# Patient Record
Sex: FEMALE | Race: WHITE | NOT HISPANIC OR LATINO | URBAN - METROPOLITAN AREA
[De-identification: names, ages, dates, MRNs, and addresses within clinical notes are randomized per-mention and may not be internally consistent; named-entity substitution may affect disease eponyms.]

---

## 2017-04-07 ENCOUNTER — APPOINTMENT (OUTPATIENT)
Dept: GENERAL RADIOLOGY | Facility: CLINIC | Age: 22
End: 2017-04-07
Attending: INTERNAL MEDICINE
Payer: COMMERCIAL

## 2017-04-07 ENCOUNTER — HOSPITAL ENCOUNTER (EMERGENCY)
Facility: CLINIC | Age: 22
Discharge: HOME | End: 2017-04-07
Attending: INTERNAL MEDICINE | Admitting: INTERNAL MEDICINE
Payer: COMMERCIAL

## 2017-04-07 VITALS
TEMPERATURE: 98.3 F | DIASTOLIC BLOOD PRESSURE: 82 MMHG | RESPIRATION RATE: 15 BRPM | OXYGEN SATURATION: 97 % | SYSTOLIC BLOOD PRESSURE: 133 MMHG

## 2017-04-07 DIAGNOSIS — R07.9 CHEST PAIN, UNSPECIFIED TYPE: ICD-10-CM

## 2017-04-07 LAB
ALBUMIN SERPL-MCNC: 3.9 G/DL (ref 3.4–5)
ALBUMIN UR-MCNC: 10 MG/DL
ALP SERPL-CCNC: 71 U/L (ref 40–150)
ALT SERPL W P-5'-P-CCNC: 21 U/L (ref 0–50)
ANION GAP SERPL CALCULATED.3IONS-SCNC: 10 MMOL/L (ref 3–14)
APPEARANCE UR: CLEAR
AST SERPL W P-5'-P-CCNC: 18 U/L (ref 0–35)
BASOPHILS # BLD AUTO: 0 10E9/L (ref 0–0.2)
BASOPHILS NFR BLD AUTO: 0.3 %
BILIRUB SERPL-MCNC: 1.2 MG/DL (ref 0.2–1.3)
BILIRUB UR QL STRIP: NEGATIVE
BUN SERPL-MCNC: 11 MG/DL (ref 7–30)
CALCIUM SERPL-MCNC: 9.3 MG/DL (ref 8.5–10.1)
CHLORIDE SERPL-SCNC: 108 MMOL/L (ref 96–110)
CO2 SERPL-SCNC: 23 MMOL/L (ref 20–32)
COLOR UR AUTO: ABNORMAL
CREAT SERPL-MCNC: 0.81 MG/DL (ref 0.5–1)
D DIMER PPP FEU-MCNC: 0.3 UG/ML FEU (ref 0–0.5)
DIFFERENTIAL METHOD BLD: NORMAL
EOSINOPHIL # BLD AUTO: 0.1 10E9/L (ref 0–0.7)
EOSINOPHIL NFR BLD AUTO: 0.6 %
ERYTHROCYTE [DISTWIDTH] IN BLOOD BY AUTOMATED COUNT: 12.8 % (ref 10–15)
GFR SERPL CREATININE-BSD FRML MDRD: 90 ML/MIN/1.7M2
GLUCOSE SERPL-MCNC: 100 MG/DL (ref 70–99)
GLUCOSE UR STRIP-MCNC: NEGATIVE MG/DL
HCG UR QL: NEGATIVE
HCT VFR BLD AUTO: 42.8 % (ref 35–47)
HGB BLD-MCNC: 13.9 G/DL (ref 11.7–15.7)
HGB UR QL STRIP: NEGATIVE
IMM GRANULOCYTES # BLD: 0 10E9/L (ref 0–0.4)
IMM GRANULOCYTES NFR BLD: 0.2 %
INR PPP: 1.15 (ref 0.86–1.14)
INTERPRETATION ECG - MUSE: NORMAL
KETONES UR STRIP-MCNC: NEGATIVE MG/DL
LEUKOCYTE ESTERASE UR QL STRIP: NEGATIVE
LIPASE SERPL-CCNC: 215 U/L (ref 73–393)
LYMPHOCYTES # BLD AUTO: 1.8 10E9/L (ref 0.8–5.3)
LYMPHOCYTES NFR BLD AUTO: 17.3 %
MCH RBC QN AUTO: 27.9 PG (ref 26.5–33)
MCHC RBC AUTO-ENTMCNC: 32.5 G/DL (ref 31.5–36.5)
MCV RBC AUTO: 86 FL (ref 78–100)
MONOCYTES # BLD AUTO: 0.5 10E9/L (ref 0–1.3)
MONOCYTES NFR BLD AUTO: 4.4 %
MUCOUS THREADS #/AREA URNS LPF: PRESENT /LPF
NEUTROPHILS # BLD AUTO: 8 10E9/L (ref 1.6–8.3)
NEUTROPHILS NFR BLD AUTO: 77.2 %
NITRATE UR QL: NEGATIVE
NRBC # BLD AUTO: 0 10*3/UL
NRBC BLD AUTO-RTO: 0 /100
PH UR STRIP: 6 PH (ref 5–7)
PLATELET # BLD AUTO: 205 10E9/L (ref 150–450)
POTASSIUM SERPL-SCNC: 3.6 MMOL/L (ref 3.4–5.3)
PROT SERPL-MCNC: 7.2 G/DL (ref 6.8–8.8)
RBC # BLD AUTO: 4.98 10E12/L (ref 3.8–5.2)
RBC #/AREA URNS AUTO: 0 /HPF (ref 0–2)
SODIUM SERPL-SCNC: 142 MMOL/L (ref 133–144)
SP GR UR STRIP: 1.01 (ref 1–1.03)
SQUAMOUS #/AREA URNS AUTO: 2 /HPF (ref 0–1)
TROPONIN I SERPL-MCNC: NORMAL UG/L (ref 0–0.04)
URN SPEC COLLECT METH UR: ABNORMAL
UROBILINOGEN UR STRIP-MCNC: NORMAL MG/DL (ref 0–2)
WBC # BLD AUTO: 10.4 10E9/L (ref 4–11)
WBC #/AREA URNS AUTO: 1 /HPF (ref 0–2)

## 2017-04-07 PROCEDURE — 81025 URINE PREGNANCY TEST: CPT | Performed by: INTERNAL MEDICINE

## 2017-04-07 PROCEDURE — 81001 URINALYSIS AUTO W/SCOPE: CPT | Performed by: INTERNAL MEDICINE

## 2017-04-07 PROCEDURE — 99285 EMERGENCY DEPT VISIT HI MDM: CPT | Mod: 25 | Performed by: INTERNAL MEDICINE

## 2017-04-07 PROCEDURE — 25000128 H RX IP 250 OP 636: Performed by: INTERNAL MEDICINE

## 2017-04-07 PROCEDURE — 93005 ELECTROCARDIOGRAM TRACING: CPT | Performed by: INTERNAL MEDICINE

## 2017-04-07 PROCEDURE — 93010 ELECTROCARDIOGRAM REPORT: CPT | Mod: Z6 | Performed by: INTERNAL MEDICINE

## 2017-04-07 PROCEDURE — 85379 FIBRIN DEGRADATION QUANT: CPT | Performed by: INTERNAL MEDICINE

## 2017-04-07 PROCEDURE — 71010 XR CHEST PORT 1 VW: CPT

## 2017-04-07 PROCEDURE — 80053 COMPREHEN METABOLIC PANEL: CPT | Performed by: INTERNAL MEDICINE

## 2017-04-07 PROCEDURE — 83690 ASSAY OF LIPASE: CPT | Performed by: INTERNAL MEDICINE

## 2017-04-07 PROCEDURE — 85610 PROTHROMBIN TIME: CPT | Performed by: INTERNAL MEDICINE

## 2017-04-07 PROCEDURE — 96360 HYDRATION IV INFUSION INIT: CPT | Performed by: INTERNAL MEDICINE

## 2017-04-07 PROCEDURE — 85025 COMPLETE CBC W/AUTO DIFF WBC: CPT | Performed by: INTERNAL MEDICINE

## 2017-04-07 PROCEDURE — 84484 ASSAY OF TROPONIN QUANT: CPT | Performed by: INTERNAL MEDICINE

## 2017-04-07 RX ORDER — NAPROXEN 500 MG/1
500 TABLET ORAL 2 TIMES DAILY PRN
Qty: 30 TABLET | Refills: 0 | Status: SHIPPED | OUTPATIENT
Start: 2017-04-07

## 2017-04-07 RX ADMIN — SODIUM CHLORIDE 1000 ML: 9 INJECTION, SOLUTION INTRAVENOUS at 08:18

## 2017-04-07 ASSESSMENT — ENCOUNTER SYMPTOMS: NAUSEA: 1

## 2017-04-07 NOTE — ED PROVIDER NOTES
"  History     Chief Complaint   Patient presents with     Chest Pain     HPI  Ade Pena is a 21 year old, otherwise healthy female who presents with chest pain. The patient reports that she was awakened around 4:30 AM today by a sharp pain and pressure in the left lateral chest. She notes that she rolled over, and states that \"something moved\" in her chest when she did this, and the chest pressure improved, though she continued to have an aching pain in her chest. She complains of associated nausea. She denies shortness of breath, though the pain worsens with taking deep breaths. She denies having any chronic medical problems. She has been on birth control for the past 2 years. She denies any lower extremity pain or swelling. She denies any personal or family history of blood clots.     History reviewed. No pertinent past medical history.    History reviewed. No pertinent surgical history.    No family history on file.    Social History   Substance Use Topics     Smoking status: Never Smoker     Smokeless tobacco: Not on file     Alcohol use Not on file     No current facility-administered medications for this encounter.      Current Outpatient Prescriptions   Medication     naproxen (NAPROSYN) 500 MG tablet      No Known Allergies     I have reviewed the Medications, Allergies, Past Medical and Surgical History, and Social History in the Epic system.    Review of Systems   Cardiovascular: Positive for chest pain (left lateral). Negative for leg swelling.   Gastrointestinal: Positive for nausea.   All other systems reviewed and are negative.      Physical Exam   BP: (!) 149/93  Heart Rate: 75  Temp: 98.3  F (36.8  C)  Resp: 16  SpO2: 97 %  Physical Exam   Constitutional: She is oriented to person, place, and time. No distress.   HENT:   Head: Atraumatic.   Mouth/Throat: Oropharynx is clear and moist. No oropharyngeal exudate.   Eyes: Pupils are equal, round, and reactive to light. No scleral icterus. "   Neck: Neck supple. No JVD present.   Cardiovascular: Normal rate, normal heart sounds and intact distal pulses.  Exam reveals no gallop and no friction rub.    No murmur heard.  Pulmonary/Chest: Effort normal and breath sounds normal. No respiratory distress. She has no wheezes. She has no rales. She exhibits no tenderness.   Abdominal: Soft. Bowel sounds are normal. She exhibits no distension and no mass. There is no tenderness. There is no rebound and no guarding.   Musculoskeletal: She exhibits no edema or tenderness.   Neurological: She is alert and oriented to person, place, and time. No cranial nerve deficit. Coordination normal.   Skin: Skin is warm. No rash noted. She is not diaphoretic.       ED Course     ED Course     Procedures     8:04 AM  The patient was seen and examined by Dr. Concepcion in Room 6.               EKG Interpretation:      Interpreted by Domenica Concepcion MD  Time reviewed: 7:46 AM  Symptoms at time of EKG: chest pain    Rhythm: normal sinus   Rate: 86 bpm  Axis: Normal  Ectopy: none  Conduction: normal  ST Segments/ T Waves: No ST-T wave changes  Q Waves: none  Comparison to prior: No old EKG available    Clinical Impression: normal EKG    Results for orders placed or performed during the hospital encounter of 04/07/17 (from the past 24 hour(s))   EKG 12 lead     Status: None    Collection Time: 04/07/17  7:45 AM   Result Value Ref Range    Interpretation ECG Click View Image link to view waveform and result    CBC with platelets differential     Status: None    Collection Time: 04/07/17  8:00 AM   Result Value Ref Range    WBC 10.4 4.0 - 11.0 10e9/L    RBC Count 4.98 3.8 - 5.2 10e12/L    Hemoglobin 13.9 11.7 - 15.7 g/dL    Hematocrit 42.8 35.0 - 47.0 %    MCV 86 78 - 100 fl    MCH 27.9 26.5 - 33.0 pg    MCHC 32.5 31.5 - 36.5 g/dL    RDW 12.8 10.0 - 15.0 %    Platelet Count 205 150 - 450 10e9/L    Diff Method Automated Method     % Neutrophils 77.2 %    % Lymphocytes 17.3 %    % Monocytes  4.4 %    % Eosinophils 0.6 %    % Basophils 0.3 %    % Immature Granulocytes 0.2 %    Nucleated RBCs 0 0 /100    Absolute Neutrophil 8.0 1.6 - 8.3 10e9/L    Absolute Lymphocytes 1.8 0.8 - 5.3 10e9/L    Absolute Monocytes 0.5 0.0 - 1.3 10e9/L    Absolute Eosinophils 0.1 0.0 - 0.7 10e9/L    Absolute Basophils 0.0 0.0 - 0.2 10e9/L    Abs Immature Granulocytes 0.0 0 - 0.4 10e9/L    Absolute Nucleated RBC 0.0    D dimer quantitative     Status: None    Collection Time: 04/07/17  8:00 AM   Result Value Ref Range    D Dimer 0.3 0.0 - 0.50 ug/ml FEU   INR     Status: Abnormal    Collection Time: 04/07/17  8:00 AM   Result Value Ref Range    INR 1.15 (H) 0.86 - 1.14   Comprehensive metabolic panel     Status: Abnormal    Collection Time: 04/07/17  8:00 AM   Result Value Ref Range    Sodium 142 133 - 144 mmol/L    Potassium 3.6 3.4 - 5.3 mmol/L    Chloride 108 96 - 110 mmol/L    Carbon Dioxide 23 20 - 32 mmol/L    Anion Gap 10 3 - 14 mmol/L    Glucose 100 (H) 70 - 99 mg/dL    Urea Nitrogen 11 7 - 30 mg/dL    Creatinine 0.81 0.50 - 1.00 mg/dL    GFR Estimate 90 >60 mL/min/1.7m2    GFR Estimate If Black >90   GFR Calc   >60 mL/min/1.7m2    Calcium 9.3 8.5 - 10.1 mg/dL    Bilirubin Total 1.2 0.2 - 1.3 mg/dL    Albumin 3.9 3.4 - 5.0 g/dL    Protein Total 7.2 6.8 - 8.8 g/dL    Alkaline Phosphatase 71 40 - 150 U/L    ALT 21 0 - 50 U/L    AST 18 0 - 35 U/L   Lipase     Status: None    Collection Time: 04/07/17  8:00 AM   Result Value Ref Range    Lipase 215 73 - 393 U/L   Troponin I     Status: None    Collection Time: 04/07/17  8:00 AM   Result Value Ref Range    Troponin I ES  0.000 - 0.045 ug/L     <0.015  The 99th percentile for upper reference range is 0.045 ug/L.  Troponin values in   the range of 0.045 - 0.120 ug/L may be associated with risks of adverse   clinical events.     HCG qualitative urine     Status: None    Collection Time: 04/07/17  8:12 AM   Result Value Ref Range    HCG Qual Urine Negative NEG    UA reflex to Microscopic and Culture     Status: Abnormal    Collection Time: 04/07/17  8:12 AM   Result Value Ref Range    Color Urine Light Yellow     Appearance Urine Clear     Glucose Urine Negative NEG mg/dL    Bilirubin Urine Negative NEG    Ketones Urine Negative NEG mg/dL    Specific Gravity Urine 1.015 1.003 - 1.035    Blood Urine Negative NEG    pH Urine 6.0 5.0 - 7.0 pH    Protein Albumin Urine 10 (A) NEG mg/dL    Urobilinogen mg/dL Normal 0.0 - 2.0 mg/dL    Nitrite Urine Negative NEG    Leukocyte Esterase Urine Negative NEG    Source Unspecified Urine     RBC Urine 0 0 - 2 /HPF    WBC Urine 1 0 - 2 /HPF    Squamous Epithelial /HPF Urine 2 (H) 0 - 1 /HPF    Mucous Urine Present (A) NEG /LPF   XR Chest Port 1 View     Status: None    Collection Time: 04/07/17  8:27 AM    Narrative    XR CHEST PORT 1 VW  4/7/2017 8:27 AM      HISTORY: cp    COMPARISON: None available    FINDINGS:   Single PA view of the chest was obtained.  Support devices:None.   Cardiomediastinal silhouette is unremarkable. Pulmonary vasculature is  distinct. No focal airspace opacity. No pleural effusion. No  pneumothorax. The trachea is midline. No acute osseous abnormality.  Visualized upper abdomen unremarkable.      Impression    IMPRESSION:   No acute cardiopulmonary abnormality.      I have personally reviewed the examination and initial interpretation  and I agree with the findings.    DIPIKA SULTANA MD             Labs Ordered and Resulted from Time of ED Arrival Up to the Time of Departure from the ED   INR - Abnormal; Notable for the following:        Result Value    INR 1.15 (*)     All other components within normal limits   COMPREHENSIVE METABOLIC PANEL - Abnormal; Notable for the following:     Glucose 100 (*)     All other components within normal limits   UA MACROSCOPIC WITH REFLEX TO MICRO AND CULTURE - Abnormal; Notable for the following:     Protein Albumin Urine 10 (*)     Squamous Epithelial /HPF Urine 2 (*)      Mucous Urine Present (*)     All other components within normal limits   CBC WITH PLATELETS DIFFERENTIAL   D DIMER QUANTITATIVE   LIPASE   TROPONIN I   HCG QUALITATIVE URINE   CARDIAC CONTINUOUS MONITORING       Assessments & Plan (with Medical Decision Making)  Chest pain, plueritic, EKG and trop neg, D Dimer neg, likely pleurodynia clinically, will DC with naproxen prn, follow up with her PMD.       I have reviewed the nursing notes.    I have reviewed the findings, diagnosis, plan and need for follow up with the patient.    Discharge Medication List as of 4/7/2017 10:49 AM      START taking these medications    Details   naproxen (NAPROSYN) 500 MG tablet Take 1 tablet (500 mg) by mouth 2 times daily as needed for moderate pain, Disp-30 tablet, R-0, Local Print             Final diagnoses:   Chest pain, unspecified type     I, Richy Christianson, am serving as a trained medical scribe to document services personally performed by Domenica Concepcion MD, based on the provider's statements to me.   I, Domenica Concepcion MD, was physically present and have reviewed and verified the accuracy of this note documented by Richy Christianson.     4/7/2017   [facility], [place], EMERGENCY DEPARTMENT     Domenica Concepcion MD  04/07/17 3246

## 2017-04-07 NOTE — ED AVS SNAPSHOT
[facility], [place], Emergency Department    [address] [place] [address]    Phone:  [phone]                                       Ade Pena   MRN: [id number]    Department:  [facility], Emergency Department   Date of Visit:  4/7/2017           After Visit Summary Signature Page     I have received my discharge instructions, and my questions have been answered. I have discussed any challenges I see with this plan with the nurse or doctor.    ..........................................................................................................................................  Patient/Patient Representative Signature      ..........................................................................................................................................  Patient Representative Print Name and Relationship to Patient    ..................................................               ................................................  Date                                            Time    ..........................................................................................................................................  Reviewed by Signature/Title    ...................................................              ..............................................  Date                                                            Time

## 2017-04-07 NOTE — ED NOTES
Pt presents to ED for a complaint of chest pain and shortness of breath at about 0430 this morning. Pt states that the pain woke her up. The pain has now resolved and she is not short of breath.

## 2017-04-07 NOTE — ED AVS SNAPSHOT
[facility], Emergency Department    [address] [facility] [address]    Phone:  [phone]                                       Ade Pean   MRN: [id number]    Department:  [facility], Emergency Department   Date of Visit:  4/7/2017           Patient Information     Date Of Birth          1995        Your diagnoses for this visit were:     Chest pain, unspecified type        You were seen by Domenica Concepcion MD.        Discharge Instructions       Please make an appointment to follow up with Your Primary Care Provider in 5-10 days if not improving.     Discharge References/Attachments     PLEURISY (ENGLISH)      24 Hour Appointment Hotline       To make an appointment at any [facility] clinic, call [phone] ([phone]). If you don't have a family doctor or clinic, we will help you find one. [facility] clinics are conveniently located to serve the needs of you and your family.             Review of your medicines      START taking        Dose / Directions Last dose taken    naproxen 500 MG tablet   Commonly known as:  NAPROSYN   Dose:  500 mg   Quantity:  30 tablet        Take 1 tablet (500 mg) by mouth 2 times daily as needed for moderate pain   Refills:  0                Prescriptions were sent or printed at these locations (1 Prescription)                   Other Prescriptions                Printed at Department/Unit printer (1 of 1)         naproxen (NAPROSYN) 500 MG tablet                Procedures and tests performed during your visit     CBC with platelets differential    Cardiac Continuous Monitoring    Comprehensive metabolic panel    D dimer quantitative    EKG 12 lead    HCG qualitative urine    INR    Lipase    Troponin I    UA reflex to Microscopic and Culture    XR Chest Port 1 View      Orders Needing Specimen Collection     None      Pending Results     Date and Time Order Name Status Description    4/7/2017 0811 XR Chest Port 1 View In process             Pending  "Culture Results     No orders found from 2017 to 2017.            Thank you for choosing [facility]       Thank you for choosing [facility] for your care. Our goal is always to provide you with excellent care. Hearing back from our patients is one way we can continue to improve our services. Please take a few minutes to complete the written survey that you may receive in the mail after you visit with us. Thank you!        [facility] Information     [employer] lets you send messages to your doctor, view your test results, renew your prescriptions, schedule appointments and more. To sign up, go to [email or web address] . Click on \"Log in\" on the left side of the screen, which will take you to the Welcome page. Then click on \"Sign up Now\" on the right side of the page.     You will be asked to enter the access code listed below, as well as some personal information. Please follow the directions to create your username and password.     Your access code is: [email or web address]  Expires: 2017 10:49 AM     Your access code will  in 90 days. If you need help or a new code, please call your [facility] clinic or [phone].        Care EveryWhere ID     This is your Care EveryWhere ID. This could be used by other organizations to access your [facility] medical records  [id number]        After Visit Summary       This is your record. Keep this with you and show to your community pharmacist(s) and doctor(s) at your next visit.                  "

## 2017-08-11 ENCOUNTER — RECORDS - HEALTHEAST (OUTPATIENT)
Dept: ADMINISTRATIVE | Facility: OTHER | Age: 22
End: 2017-08-11

## 2017-08-18 ENCOUNTER — HOSPITAL ENCOUNTER (OUTPATIENT)
Dept: CARDIOLOGY | Facility: CLINIC | Age: 22
Discharge: HOME | End: 2017-08-18
Attending: FAMILY MEDICINE

## 2017-08-18 DIAGNOSIS — R07.9 CHEST PAIN: ICD-10-CM

## 2017-08-18 LAB
CV STRESS CURRENT BP HE: NORMAL
CV STRESS CURRENT HR HE: 102
CV STRESS CURRENT HR HE: 103
CV STRESS CURRENT HR HE: 108
CV STRESS CURRENT HR HE: 110
CV STRESS CURRENT HR HE: 111
CV STRESS CURRENT HR HE: 113
CV STRESS CURRENT HR HE: 116
CV STRESS CURRENT HR HE: 122
CV STRESS CURRENT HR HE: 123
CV STRESS CURRENT HR HE: 124
CV STRESS CURRENT HR HE: 127
CV STRESS CURRENT HR HE: 130
CV STRESS CURRENT HR HE: 133
CV STRESS CURRENT HR HE: 149
CV STRESS CURRENT HR HE: 154
CV STRESS CURRENT HR HE: 160
CV STRESS CURRENT HR HE: 172
CV STRESS CURRENT HR HE: 172
CV STRESS CURRENT HR HE: 176
CV STRESS CURRENT HR HE: 185
CV STRESS CURRENT HR HE: 186
CV STRESS CURRENT HR HE: 190
CV STRESS CURRENT HR HE: 78
CV STRESS CURRENT HR HE: 91
CV STRESS DEVIATION TIME HE: NORMAL
CV STRESS ECHO PERCENT HR HE: NORMAL
CV STRESS EXERCISE STAGE HE: NORMAL
CV STRESS EXERCISE STAGE REACHED HE: NORMAL
CV STRESS FINAL RESTING BP HE: NORMAL
CV STRESS FINAL RESTING HR HE: 103
CV STRESS MAX HR HE: 191
CV STRESS MAX TREADMILL GRADE HE: 16
CV STRESS MAX TREADMILL SPEED HE: 4.2
CV STRESS PEAK DIA BP HE: NORMAL
CV STRESS PEAK SYS BP HE: NORMAL
CV STRESS PHASE HE: NORMAL
CV STRESS PROTOCOL HE: NORMAL
CV STRESS RESTING PT POSITION HE: NORMAL
CV STRESS RESTING PT POSITION HE: NORMAL
CV STRESS ST DEVIATION AMOUNT HE: NORMAL
CV STRESS ST DEVIATION ELEVATION HE: NORMAL
CV STRESS ST EVELATION AMOUNT HE: NORMAL
CV STRESS TEST TYPE HE: NORMAL
CV STRESS TOTAL STAGE TIME MIN 1 HE: NORMAL
STRESS ECHO BASELINE BP: NORMAL
STRESS ECHO BASELINE HR: 86
STRESS ECHO CALCULATED PERCENT HR: 96 %
STRESS ECHO LAST STRESS BP: NORMAL
STRESS ECHO LAST STRESS HR: 190
STRESS ECHO POST ESTIMATED WORKLOAD: 12.1
STRESS ECHO POST EXERCISE DUR MIN: 10
STRESS ECHO POST EXERCISE DUR SEC: 35
STRESS ECHO TARGET HR: 192

## 2017-08-29 ENCOUNTER — RECORDS - HEALTHEAST (OUTPATIENT)
Dept: ADMINISTRATIVE | Facility: OTHER | Age: 22
End: 2017-08-29

## 2017-09-01 ENCOUNTER — OFFICE VISIT - HEALTHEAST (OUTPATIENT)
Dept: CARDIOLOGY | Facility: CLINIC | Age: 22
End: 2017-09-01

## 2017-09-01 DIAGNOSIS — R07.9 CHEST PAIN, UNSPECIFIED TYPE: ICD-10-CM

## 2017-09-01 ASSESSMENT — MIFFLIN-ST. JEOR: SCORE: 1399.52

## 2017-11-01 ENCOUNTER — RECORDS - HEALTHEAST (OUTPATIENT)
Dept: ADMINISTRATIVE | Facility: OTHER | Age: 22
End: 2017-11-01

## 2018-12-22 ENCOUNTER — RECORDS - HEALTHEAST (OUTPATIENT)
Dept: ADMINISTRATIVE | Facility: OTHER | Age: 23
End: 2018-12-22

## 2018-12-28 ENCOUNTER — AMBULATORY - HEALTHEAST (OUTPATIENT)
Dept: ADMINISTRATIVE | Facility: CLINIC | Age: 23
End: 2018-12-28

## 2018-12-28 DIAGNOSIS — L72.3 SEBACEOUS CYST: ICD-10-CM

## 2019-01-04 ENCOUNTER — OFFICE VISIT - HEALTHEAST (OUTPATIENT)
Dept: SURGERY | Facility: CLINIC | Age: 24
End: 2019-01-04

## 2019-01-04 DIAGNOSIS — M79.621 AXILLARY PAIN, RIGHT: ICD-10-CM

## 2019-01-04 ASSESSMENT — MIFFLIN-ST. JEOR: SCORE: 1464.38

## 2021-05-31 VITALS — BODY MASS INDEX: 19.11 KG/M2 | HEIGHT: 69 IN | WEIGHT: 129 LBS

## 2021-06-02 VITALS — BODY MASS INDEX: 21.22 KG/M2 | WEIGHT: 143.3 LBS | HEIGHT: 69 IN

## 2021-06-18 NOTE — LETTER
Letter by Jah Lane MD at      Author: Jah Lane MD Service: -- Author Type: --    Filed:  Encounter Date: 1/4/2019 Status: (Other)       Helena Last MD  [address]  [place] [address]                                  January 21, 2019    Patient: Ade Pena   MR Number: [id number]   YOB: 1995   Date of Visit: 1/4/2019     Dear Dr. Berhane MD:    Thank you for referring Ade Pena to me for evaluation. Below are the relevant portions of my assessment and plan of care.    If you have questions, please do not hesitate to call me. I look forward to following Ade along with you.    Sincerely,        Jah Lane MD          CC  No Recipients  Jah Lane MD  1/21/2019  2:31 PM  Sign at close encounter  [facility]    HPI:    23 y.o. year old female who I have been consulted by Helena Last MD for evaluation of Consult (Sebaceous Cyst right axilla )  She is here for evaluation of, palpable mass area in the right axilla.  She said this evaluated multiple times before went through and saw her prior studies and workups is all been counted document this is probably an accessory breast tissue.    Allergies:Patient has no known allergies.    Past Medical History:   Diagnosis Date   ? Generalized anxiety disorder        Past Surgical History:   Procedure Laterality Date   ? WISDOM TOOTH EXTRACTION         CURRENT MEDS:  Current Outpatient Medications on File Prior to Visit   Medication Sig Dispense Refill   ? FLUoxetine (PROZAC) 10 MG tablet Take 10 mg by mouth daily.     ? levonorgestrel-ethinyl estradiol (AVIANE,ALESSE,LESSINA) 0.1-20 mg-mcg per tablet Take 1 tablet by mouth daily.     ? cholecalciferol, vitamin D3, 1,000 unit tablet Take 1,000 Units by mouth daily.     ? OMEGA-3/DHA/EPA/FISH OIL (FISH OIL-OMEGA-3 FATTY ACIDS) 300-1,000 mg capsule Take 500 mg by mouth daily.       No current facility-administered medications on file prior to  "visit.        Family History   Problem Relation Age of Onset   ? Atrial fibrillation Paternal Aunt    ? No Medical Problems Mother    ? Dyslipidemia Father    ? No Medical Problems Sister    ? CABG Maternal Grandfather 58   ? Acute Myocardial Infarction Maternal Grandfather 58        smoker   ? Amputation of Leg Below Knee Maternal Grandfather 58   ? Sudden death Neg Hx         reports that  has never smoked. she has never used smokeless tobacco. She reports that she drinks alcohol. She reports that she does not use drugs.    Review of Systems:  Negative except area of her right breast was count of her right axilla which bothers her off and on.  When she thinks was probably cyclic in nature. Otherwise twelve system of review is negative.      OBJECTIVE:  Vitals:    01/04/19 1231   BP: 133/86   Patient Site: Right Arm   Patient Position: Sitting   Cuff Size: Adult Regular   Pulse: 78   Weight: 143 lb 4.8 oz (65 kg)   Height: 5' 9\" (1.753 m)     Body mass index is 21.16 kg/m .    EXAM:  GENERAL: This is a well-developed 23 y.o. female who appears her stated age  HEAD: normocephalic  HEENT: Pupils equal and reactive bilaterally  Right axilla palpable breast tail in axilla. No enlarged lymph nodes  EXTREMITIES: Grossly normal, warm,   NEUROLOGIC: Focally intact, nonfocal  INTEGUMENT: No open lesions or ulcers  VASCULAR: Pulses intact, symmetrical upper and lower extremities.        LABS:  No results found for: WBC, HGB, HCT, MCV, PLT  INR/Prothrombin Time      No results found for: HGBA1C  No results found for: ALT, AST, GGT, ALKPHOS, BILITOT     Images:   Reviewed ultrasound from sample radiology which shows that this is accessory gland tissue from her breast in the right axilla    Assessment/Plan:   History of breast tissue in the right axilla noted    I discussed with her today the 2 you could excise this there are some risks related to that about doing so.  She is under local MAC anesthesia the same day surgery " setting I would not recommend it but it slowly up to her if she would like to proceed she could I also gave her an option get a second opinion she seems frustrated she thinks this is a cystic count of issue and this needs to be resected and sent a big deal but it is probably breast tissue and extension of breast tissue.  I gave her doctors? Number is an option here in clinic for a second opinion    No Follow-up on file.     Jah Lane MD  [facility] Department of Surgery    Simeon Quezada CMA  1/4/2019 12:31 PM  Sign at close encounter  Excision education & surgery packet provided to luna JOHANSEN. CARLY Quezada ([facility])     Ph: [phone]    Fx: [phone]

## 2021-06-22 NOTE — PROGRESS NOTES
Excision education & surgery packet provided to pt.    Simeon Quezada CMA ([facility])     Ph: [phone]    Fx: [phone]

## 2021-06-23 NOTE — PROGRESS NOTES
[facility]    HPI:    23 y.o. year old female who I have been consulted by Helena Last MD for evaluation of Consult (Sebaceous Cyst right axilla )  She is here for evaluation of, palpable mass area in the right axilla.  She said this evaluated multiple times before went through and saw her prior studies and workups is all been counted document this is probably an accessory breast tissue.    Allergies:Patient has no known allergies.    Past Medical History:   Diagnosis Date     Generalized anxiety disorder        Past Surgical History:   Procedure Laterality Date     WISDOM TOOTH EXTRACTION         CURRENT MEDS:  Current Outpatient Medications on File Prior to Visit   Medication Sig Dispense Refill     FLUoxetine (PROZAC) 10 MG tablet Take 10 mg by mouth daily.       levonorgestrel-ethinyl estradiol (AVIANE,ALESSE,LESSINA) 0.1-20 mg-mcg per tablet Take 1 tablet by mouth daily.       cholecalciferol, vitamin D3, 1,000 unit tablet Take 1,000 Units by mouth daily.       OMEGA-3/DHA/EPA/FISH OIL (FISH OIL-OMEGA-3 FATTY ACIDS) 300-1,000 mg capsule Take 500 mg by mouth daily.       No current facility-administered medications on file prior to visit.        Family History   Problem Relation Age of Onset     Atrial fibrillation Paternal Aunt      No Medical Problems Mother      Dyslipidemia Father      No Medical Problems Sister      CABG Maternal Grandfather 58     Acute Myocardial Infarction Maternal Grandfather 58        smoker     Amputation of Leg Below Knee Maternal Grandfather 58     Sudden death Neg Hx         reports that  has never smoked. she has never used smokeless tobacco. She reports that she drinks alcohol. She reports that she does not use drugs.    Review of Systems:  Negative except area of her right breast was count of her right axilla which bothers her off and on.  When she thinks was probably cyclic in nature. Otherwise twelve system of review is negative.      OBJECTIVE:  Vitals:     "01/04/19 1231   BP: 133/86   Patient Site: Right Arm   Patient Position: Sitting   Cuff Size: Adult Regular   Pulse: 78   Weight: 143 lb 4.8 oz (65 kg)   Height: 5' 9\" (1.753 m)     Body mass index is 21.16 kg/m .    EXAM:  GENERAL: This is a well-developed 23 y.o. female who appears her stated age  HEAD: normocephalic  HEENT: Pupils equal and reactive bilaterally  Right axilla palpable breast tail in axilla. No enlarged lymph nodes  EXTREMITIES: Grossly normal, warm,   NEUROLOGIC: Focally intact, nonfocal  INTEGUMENT: No open lesions or ulcers  VASCULAR: Pulses intact, symmetrical upper and lower extremities.        LABS:  No results found for: WBC, HGB, HCT, MCV, PLT  INR/Prothrombin Time      No results found for: HGBA1C  No results found for: ALT, AST, GGT, ALKPHOS, BILITOT     Images:   Reviewed ultrasound from sample radiology which shows that this is accessory gland tissue from her breast in the right axilla    Assessment/Plan:   History of breast tissue in the right axilla noted    I discussed with her today the 2 you could excise this there are some risks related to that about doing so.  She is under local MAC anesthesia the same day surgery setting I would not recommend it but it slowly up to her if she would like to proceed she could I also gave her an option get a second opinion she seems frustrated she thinks this is a cystic count of issue and this needs to be resected and sent a big deal but it is probably breast tissue and extension of breast tissue.  I gave her doctors  Number is an option here in clinic for a second opinion    No Follow-up on file.     Jah Lane MD  [facility] Department of Surgery  "

## 2021-06-25 NOTE — PROGRESS NOTES
Progress Notes by Young Lei MD at 9/1/2017 10:30 AM     Author: Young Lei MD Service: -- Author Type: Physician    Filed: 9/1/2017 11:20 AM Encounter Date: 9/1/2017 Status: Signed    : Young Lei MD (Physician)           Click to link to [facility] Consultation Note    Thank you, Dr. Helena Last, for asking Ade Pena to meet with me in consultation today at the [facility] to evaluate chest pain.     Assessment:    1. Chest pain, unspecified type         Discussion:    Ade has chronic, nonspecific chest discomfort that is not suggestive of a cardiac problem.  She has infrequent strong heartbeats that may or may not represent premature beats.  If she has isolated premature beats, these are highly unlikely to be harmful to her.    Plan:    1.  There are no cardiac restrictions to physical exertion.  2.  I do not recommend a patient triggered monitor at this time, but it could be performed later if Ade's palpitations worsen.  3.  I do not recommend other cardiac testing at this time.    Ade may contact my team at any time should she have new questions or concerns about her heart.    An After Visit Summary was printed and given to the patient.    Current History:    Ade has had random left lower anterior chest pain since April 8, 2017 when she went to the [facility] ER. The random pain lasted for a few seconds.  It recurred intermittently for a couple of months but has not been present for a few months. She also has noticed an occasional strong heart beat once every few days. In addition, she reports an aching feeling for hours at a time in the chest that does not prevent her doing other activities.     She played tennis and ran hurdles in high school.  She still enjoys being physically active.  Sometimes she feels her heart rate takes longer than usual to recover after exertion, but she has had no decline in  "her exercise capacity.    She has not experienced loss of consciousness, orthopnea, unusual shortness of breath with exertion, or edema.    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  She is living at her parents home for this school year.    She sees a counselor who states she has \"OCD tendencies\".  She was also recently started on an SSRI for anxiety.    Her maternal grandfather had advanced atherosclerosis at age 58 and was a heavy smoker.  There are no first-degree family members with heart disease and there is no family history of sudden death.    Patient Active Problem List   Diagnosis   ? Generalized anxiety disorder       Past Medical History:  Past Medical History:   Diagnosis Date   ? Generalized anxiety disorder        Past Surgical History:  Past Surgical History:   Procedure Laterality Date   ? WISDOM TOOTH EXTRACTION         Family History:  Family History   Problem Relation Age of Onset   ? Atrial fibrillation Paternal Aunt    ? No Medical Problems Mother    ? Dyslipidemia Father    ? No Medical Problems Sister    ? CABG Maternal Grandfather 58   ? Acute Myocardial Infarction Maternal Grandfather 58     smoker   ? Amputation of Leg Below Knee Maternal Grandfather 58   ? Sudden death Neg Hx        Social History:   reports that she has never smoked. She has never used smokeless tobacco. She reports that she drinks alcohol. She reports that she does not use illicit drugs.    Medications:  Outpatient Encounter Prescriptions as of 9/1/2017   Medication Sig Dispense Refill   ? FLUoxetine (PROZAC) 10 MG tablet Take 10 mg by mouth daily.     ? levonorgestrel-ethinyl estradiol (AVIANE,ALESSE,LESSINA) 0.1-20 mg-mcg per tablet Take 1 tablet by mouth daily.     ? cholecalciferol, vitamin D3, 1,000 unit tablet Take 1,000 Units by mouth daily.     ? OMEGA-3/DHA/EPA/FISH OIL (FISH OIL-OMEGA-3 FATTY ACIDS) 300-1,000 mg capsule Take 500 mg by mouth daily.   " "    No facility-administered encounter medications on file as of 9/1/2017.        Allergies:  Review of patient's allergies indicates no known allergies.    Review of Systems:     General: WNL  Eyes: WNL  Ears/Nose/Throat: WNL  Lungs: WNL  Heart: Chest Pain  Stomach: Nausea  Bladder: WNL  Muscle/Joints: WNL  Skin: WNL  Nervous System: WNL  Mental Health: Anxiety     Blood: WNL    Objective:     Physical Exam:  Wt Readings from Last 5 Encounters:   09/01/17 129 lb (58.5 kg)      5' 9\" (1.753 m)  Body mass index is 19.05 kg/(m^2).  /72 (Patient Site: Right Arm, Patient Position: Sitting, Cuff Size: Adult Regular)  Pulse 80  Resp 16  Ht 5' 9\" (1.753 m)  Wt 129 lb (58.5 kg)  SpO2 100%  BMI 19.05 kg/m2    General Appearance: Alert and not in distress   HEENT: No scleral icterus; the tongue is midline and the mucous membranes are pink and moist   Neck: No cervical bruits, adenopathy, or thyromegaly; jugular venous pressure is less than 5 cm   Chest: The spine is straight and the chest is symmetric   Lungs: Respirations are unlabored; the lungs are clear to auscultation   Cardiovasular: Auscultation reveals normal first and second heart sounds with no murmurs, rubs, or gallops; the carotid, radial, femoral, and posterior tibial pulses are intact   Abdomen: No organomegaly, masses, bruits, or tenderness; bowel sounds are present   Extremities: No cyanosis, clubbing or edema   Skin: No xanthelasma   Neurologic: Mood and affect are appropriate       Cardiac testing:    EKG: Sinus rhythm, normal EKG per my personal review.     Results for orders placed during the hospital encounter of 08/18/17   Stress Test Graded [[id number]] 08/18/2017    Narrative   Exercise electrocardiography is negative for inducible myocardial   ischemia or arrhythmia.    Mild diastolic hypertension is noted with exertion.    The patient's exercise capacity is normal.    There is no prior study available.        She exercised for 10 minutes " and 35 seconds according to the Braden protocol.  Her diastolic blood pressure was 90 mmHg at the peak of exercise.  It was normal otherwise.    Imaging:    Report of CT pulmonary angiogram from April 27, 2017 at [facility] was personally reviewed and was described as a normal study without an explanation for the patient's chest pain.    Lab Review:    No results found for: NA, K, CL, CO2, BUN, CREATININE, GLUCOSE, CALCIUM  No results found for: WBC, HGB, HCT, MCV, PLT  No results found for: CHOL, TRIG, HDL, LDLDIRECT        Much or all of the text in this note was generated through the use of the Dragon Dictate voice-to-text software. Errors in spelling or words which seem out of context are unintentional. Sound alike errors, in particular, may have escaped editing.